# Patient Record
Sex: FEMALE | Race: WHITE
[De-identification: names, ages, dates, MRNs, and addresses within clinical notes are randomized per-mention and may not be internally consistent; named-entity substitution may affect disease eponyms.]

---

## 2020-11-24 ENCOUNTER — TRANSCRIPTION ENCOUNTER (OUTPATIENT)
Age: 45
End: 2020-11-24

## 2022-09-04 ENCOUNTER — NON-APPOINTMENT (OUTPATIENT)
Age: 47
End: 2022-09-04

## 2022-09-06 ENCOUNTER — NON-APPOINTMENT (OUTPATIENT)
Age: 47
End: 2022-09-06

## 2022-09-09 ENCOUNTER — NON-APPOINTMENT (OUTPATIENT)
Age: 47
End: 2022-09-09

## 2023-03-23 ENCOUNTER — NON-APPOINTMENT (OUTPATIENT)
Age: 48
End: 2023-03-23

## 2023-03-24 ENCOUNTER — NON-APPOINTMENT (OUTPATIENT)
Age: 48
End: 2023-03-24

## 2023-03-30 ENCOUNTER — APPOINTMENT (OUTPATIENT)
Dept: ORTHOPEDIC SURGERY | Facility: CLINIC | Age: 48
End: 2023-03-30
Payer: COMMERCIAL

## 2023-03-30 ENCOUNTER — FORM ENCOUNTER (OUTPATIENT)
Age: 48
End: 2023-03-30

## 2023-03-30 VITALS — BODY MASS INDEX: 34.04 KG/M2 | WEIGHT: 185 LBS | HEIGHT: 62 IN

## 2023-03-30 DIAGNOSIS — Z78.9 OTHER SPECIFIED HEALTH STATUS: ICD-10-CM

## 2023-03-30 DIAGNOSIS — M51.36 OTHER INTERVERTEBRAL DISC DEGENERATION, LUMBAR REGION: ICD-10-CM

## 2023-03-30 DIAGNOSIS — M54.16 RADICULOPATHY, LUMBAR REGION: ICD-10-CM

## 2023-03-30 PROBLEM — Z00.00 ENCOUNTER FOR PREVENTIVE HEALTH EXAMINATION: Status: ACTIVE | Noted: 2023-03-30

## 2023-03-30 PROCEDURE — 72170 X-RAY EXAM OF PELVIS: CPT

## 2023-03-30 PROCEDURE — 99204 OFFICE O/P NEW MOD 45 MIN: CPT

## 2023-03-30 PROCEDURE — 72110 X-RAY EXAM L-2 SPINE 4/>VWS: CPT

## 2023-03-30 RX ORDER — MELOXICAM 15 MG/1
15 TABLET ORAL
Qty: 30 | Refills: 1 | Status: ACTIVE | COMMUNITY
Start: 2023-03-30 | End: 1900-01-01

## 2023-03-30 RX ORDER — GABAPENTIN 100 MG/1
100 CAPSULE ORAL
Qty: 60 | Refills: 2 | Status: ACTIVE | COMMUNITY
Start: 2023-03-30 | End: 1900-01-01

## 2023-03-30 NOTE — ASSESSMENT
[FreeTextEntry1] : Severe 10/10 pain with RLE radic and weakness\par Has already initiated conservative treatment > one month ago including narcotics, MDP, NSAIDs, muscle relaxants\par MRI to discuss injection and surgical interventions

## 2023-03-30 NOTE — HISTORY OF PRESENT ILLNESS
[10] : 10 [Burning] : burning [Dull/Aching] : dull/aching [Radiating] : radiating [Sharp] : sharp [Shooting] : shooting [Constant] : constant [Sleep] : sleep [Meds] : meds [Sitting] : sitting [Standing] : standing [Walking] : walking [de-identified] : 3/30/23- Severe LBP with pain radiating down the RLE to the posterior leg and cramping of plantar foot with N/T in the toes. Seen in Urgent Care x 2, took MDP, muscle relaxant, opiates, no relief. No bb dysfunction. \par  [] : no [FreeTextEntry5] : \par  [FreeTextEntry6] : numbness  [FreeTextEntry7] : Lower back down RT Leg  [de-identified] : 03/25/23 [de-identified] : urgent care

## 2023-03-30 NOTE — IMAGING
[Disc space narrowing] : Disc space narrowing [AP] : anteroposterior [There are no fractures, subluxations or dislocations. No significant abnormalities are seen] : There are no fractures, subluxations or dislocations. No significant abnormalities are seen [de-identified] : LSPINE\par Inspection: No rash or ecchymosis\par Palpation:R SIJ TTP\par ROM: Limited all planes\par Strength: 5/5 LEFT hip flexors, knee extensors, ankle dorsiflexors, EHL, ankle plantarflexors. RLE GS 4/5, unable to perform single leg heel rise, otherwise 5/5\par Sensation: Sensation present to light touch bilateral L2-S1 distributions, altered plantar foot and toes on Right\par Provocative maneuvers: + R straight leg raise, + Left contralateral SLR\par \par Bilateral hips-\par Palpation: No tenderness to palpation over greater trochanter or IT band\par ROM: No pain with flexion and internal rotation \par \par Ambulating with cane

## 2023-03-31 ENCOUNTER — APPOINTMENT (OUTPATIENT)
Dept: MRI IMAGING | Facility: CLINIC | Age: 48
End: 2023-03-31
Payer: COMMERCIAL

## 2023-03-31 PROCEDURE — 72148 MRI LUMBAR SPINE W/O DYE: CPT

## 2023-04-06 ENCOUNTER — APPOINTMENT (OUTPATIENT)
Dept: ORTHOPEDIC SURGERY | Facility: CLINIC | Age: 48
End: 2023-04-06
Payer: COMMERCIAL

## 2023-04-06 PROCEDURE — 99215 OFFICE O/P EST HI 40 MIN: CPT

## 2023-04-06 NOTE — ASSESSMENT
[FreeTextEntry1] : discussed Right L5/S1 discectomy\par \par Discectomy- We've discussed the surgery details as well as potential for complications including but not limited to pain, scar, bleeding and infection. There is also a possibility for recurrent or residual disk herniation, failure or fracture of bone, and need for future surgery. Finally, we discussed potential for injury to nerves, the spinal cord either transient or permanent, damage to blood vessels, CSF leak, blindness, need for transfusion, and medical complications. The patient verbalized understanding and all questions were answered. \par \par C/w PT, medications \par \par NSAIDs- Patient warned of risk of medication to GI tract, increased blood pressure, cardiac risk, and risk of fluid retention.  Advised to clear medication with internist or PCP if any concurrent health problem with heart, blood pressure, or GI system exists.\par \par Gabapentin- Patient advised of sedating effects, instructed not to drive, operate machinery, or take with other sedating medications. Advised of need to taper on/off medication and risk of abruptly stopping gabapentin.

## 2023-04-06 NOTE — HISTORY OF PRESENT ILLNESS
[10] : 10 [Burning] : burning [Dull/Aching] : dull/aching [Radiating] : radiating [Sharp] : sharp [Shooting] : shooting [Constant] : constant [Sleep] : sleep [Meds] : meds [Sitting] : sitting [Standing] : standing [Walking] : walking [de-identified] : 3/31/23 Lumbar MRI  - report noted in chart. \par Findings: Lordosis is maintained. No compression fracture. No spondylolysis.\par T12-L1: No herniation, foraminal stenosis, or central stenosis.\par L1-L2: No herniation, foraminal stenosis, or central stenosis. Facet hypertrophy and ligamentum flavum \par hypertrophy.\par L2-L3: No herniation, foraminal stenosis, or central stenosis. Facet hypertrophy and ligamentum flavum \par hypertrophy.\par L3-L4: Bulge, facet hypertrophy, and ligamentum flavum hypertrophy with facet effusion. No herniation, \par foraminal stenosis, or central stenosis.\par L4-L5: Bulge, facet hypertrophy, and ligamentum flavum hypertrophy with no foraminal stenosis. No central \par herniation. No central stenosis.\par L5-S1: Loss of disc signal and height. Bulge with central and asymmetric to right herniation with extruded \par asymmetric to right component impressing on the thecal sac with contact but no displacement of the S1 nerve \par roots. Epidural lipomatosis. Mild central stenosis.\par Ind. review- R paracentral HNP L5/S1 encroaching on traversing root\par --------------------------------------------------------\par PMH- denies\par PSH-  c section x 2\par SH- no tob. etoh, occ. THC\par Occ-  [facility]\par \par 3/30/23- Severe LBP with pain radiating down the RLE to the posterior leg and cramping of plantar foot with N/T in the toes. Seen in Urgent Care x 2, took MDP, muscle relaxant, opiates, no relief. No bb dysfunction. \par 4/6/23- Still severe LBP radiating down the RLE posteriorly to plantar foot. Still N/T in same.  [] : no [FreeTextEntry5] : DION 47 year old F here for follow up Lower Back, reports on going symptoms  [FreeTextEntry6] : numbness  [FreeTextEntry7] : Lower back down RT Leg  [de-identified] : 03/25/23 [de-identified] : urgent care

## 2023-04-06 NOTE — IMAGING
[Disc space narrowing] : Disc space narrowing [AP] : anteroposterior [There are no fractures, subluxations or dislocations. No significant abnormalities are seen] : There are no fractures, subluxations or dislocations. No significant abnormalities are seen [de-identified] : LSPINE\par Inspection: No rash or ecchymosis\par Palpation:R SIJ TTP\par ROM: Limited all planes\par Strength: 5/5 LEFT hip flexors, knee extensors, ankle dorsiflexors, EHL, ankle plantarflexors. RLE GS 4/5, unable to perform single leg heel rise, otherwise 5/5\par Sensation: Sensation present to light touch bilateral L2-S1 distributions, altered plantar foot and toes on Right\par Provocative maneuvers: + R straight leg raise, + Left contralateral SLR\par \par Bilateral hips-\par Palpation: No tenderness to palpation over greater trochanter or IT band\par ROM: No pain with flexion and internal rotation \par \par Ambulating with pain

## 2023-04-10 DIAGNOSIS — M51.26 OTHER INTERVERTEBRAL DISC DISPLACEMENT, LUMBAR REGION: ICD-10-CM

## 2023-04-12 ENCOUNTER — NON-APPOINTMENT (OUTPATIENT)
Age: 48
End: 2023-04-12

## 2023-07-29 ENCOUNTER — NON-APPOINTMENT (OUTPATIENT)
Age: 48
End: 2023-07-29

## 2023-08-04 ENCOUNTER — NON-APPOINTMENT (OUTPATIENT)
Age: 48
End: 2023-08-04

## 2024-02-03 ENCOUNTER — NON-APPOINTMENT (OUTPATIENT)
Age: 49
End: 2024-02-03

## 2024-08-15 ENCOUNTER — NON-APPOINTMENT (OUTPATIENT)
Age: 49
End: 2024-08-15

## 2024-08-29 ENCOUNTER — NON-APPOINTMENT (OUTPATIENT)
Age: 49
End: 2024-08-29

## 2025-01-01 ENCOUNTER — NON-APPOINTMENT (OUTPATIENT)
Age: 50
End: 2025-01-01

## 2025-07-09 ENCOUNTER — NON-APPOINTMENT (OUTPATIENT)
Age: 50
End: 2025-07-09